# Patient Record
Sex: MALE | Race: WHITE | NOT HISPANIC OR LATINO | Employment: FULL TIME | URBAN - METROPOLITAN AREA
[De-identification: names, ages, dates, MRNs, and addresses within clinical notes are randomized per-mention and may not be internally consistent; named-entity substitution may affect disease eponyms.]

---

## 2024-06-20 ENCOUNTER — HOSPITAL ENCOUNTER (EMERGENCY)
Facility: HOSPITAL | Age: 59
Discharge: HOME | End: 2024-06-20
Payer: COMMERCIAL

## 2024-06-20 VITALS
HEART RATE: 67 BPM | BODY MASS INDEX: 28.17 KG/M2 | TEMPERATURE: 99.3 F | WEIGHT: 208 LBS | RESPIRATION RATE: 16 BRPM | DIASTOLIC BLOOD PRESSURE: 84 MMHG | HEIGHT: 72 IN | SYSTOLIC BLOOD PRESSURE: 142 MMHG | OXYGEN SATURATION: 98 %

## 2024-06-20 DIAGNOSIS — T15.92XA FOREIGN BODY, EYE, LEFT, INITIAL ENCOUNTER: Primary | ICD-10-CM

## 2024-06-20 PROCEDURE — 2500000001 HC RX 250 WO HCPCS SELF ADMINISTERED DRUGS (ALT 637 FOR MEDICARE OP): Performed by: NURSE PRACTITIONER

## 2024-06-20 PROCEDURE — 99283 EMERGENCY DEPT VISIT LOW MDM: CPT | Performed by: NURSE PRACTITIONER

## 2024-06-20 PROCEDURE — 65205 REMOVE FOREIGN BODY FROM EYE: CPT | Mod: LT | Performed by: NURSE PRACTITIONER

## 2024-06-20 PROCEDURE — 2500000005 HC RX 250 GENERAL PHARMACY W/O HCPCS: Performed by: NURSE PRACTITIONER

## 2024-06-20 RX ORDER — ERYTHROMYCIN 5 MG/G
1 OINTMENT OPHTHALMIC EVERY 6 HOURS
Qty: 1 G | Refills: 0 | Status: SHIPPED | OUTPATIENT
Start: 2024-06-20 | End: 2024-06-27

## 2024-06-20 RX ORDER — TETRACAINE HYDROCHLORIDE 5 MG/ML
1 SOLUTION OPHTHALMIC ONCE
Status: COMPLETED | OUTPATIENT
Start: 2024-06-20 | End: 2024-06-20

## 2024-06-20 RX ORDER — ERYTHROMYCIN 5 MG/G
1 OINTMENT OPHTHALMIC ONCE
Status: COMPLETED | OUTPATIENT
Start: 2024-06-20 | End: 2024-06-20

## 2024-06-20 ASSESSMENT — LIFESTYLE VARIABLES
EVER FELT BAD OR GUILTY ABOUT YOUR DRINKING: NO
HAVE PEOPLE ANNOYED YOU BY CRITICIZING YOUR DRINKING: NO
EVER HAD A DRINK FIRST THING IN THE MORNING TO STEADY YOUR NERVES TO GET RID OF A HANGOVER: NO
HAVE YOU EVER FELT YOU SHOULD CUT DOWN ON YOUR DRINKING: NO
TOTAL SCORE: 0

## 2024-06-20 ASSESSMENT — PAIN DESCRIPTION - ORIENTATION: ORIENTATION: LEFT

## 2024-06-20 ASSESSMENT — COLUMBIA-SUICIDE SEVERITY RATING SCALE - C-SSRS
2. HAVE YOU ACTUALLY HAD ANY THOUGHTS OF KILLING YOURSELF?: NO
1. IN THE PAST MONTH, HAVE YOU WISHED YOU WERE DEAD OR WISHED YOU COULD GO TO SLEEP AND NOT WAKE UP?: NO
6. HAVE YOU EVER DONE ANYTHING, STARTED TO DO ANYTHING, OR PREPARED TO DO ANYTHING TO END YOUR LIFE?: NO

## 2024-06-20 ASSESSMENT — VISUAL ACUITY
OS: 20/40
OD: 20/15
OU: 20/15
OU: 1

## 2024-06-20 ASSESSMENT — PAIN DESCRIPTION - LOCATION: LOCATION: EYE

## 2024-06-20 ASSESSMENT — PAIN SCALES - GENERAL: PAINLEVEL_OUTOF10: 3

## 2024-06-20 ASSESSMENT — PAIN - FUNCTIONAL ASSESSMENT: PAIN_FUNCTIONAL_ASSESSMENT: 0-10

## 2024-06-20 NOTE — ED PROVIDER NOTES
HPI   Chief Complaint   Patient presents with    Eye Pain     L eye states at work today at 10 am started to hurt.  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  He has tried self rinsing with no improvement.  He denies any fever, chills, vision loss, traumatic blow to the face, headache, numbness, weakness, earache, sore throat, chest pain, shortness of breath, nausea or vomiting.  He wears glasses, no contacts and has no history of macular degeneration, glaucoma or surgical history to the eye.  He does not recall the name of [facility].  His symptoms are mild to moderate in severity and persistent nature.      History provided by:  Patient and spouse   used: No                        Jennifer Coma Scale Score: 15                     Patient History   No past medical history on file.  Past Surgical History:   Procedure Laterality Date    US GUIDED ABDOMINAL PARACENTESIS  1/27/2020    US GUIDED ABDOMINAL PARACENTESIS 1/27/2020 [facility]ICAL LEGAC[facility]    US GUIDED ABDOMINAL PARACENTESIS  2/4/2020    US GUIDED ABDOMINAL PARACENTESIS 2/4/2020 [facility] CLINICAL LEGACY     No family history on file.  Social History     Tobacco Use    Smoking status: Not on file    Smokeless tobacco: Not on file   Substance Use Topics    Alcohol use: Not on file    Drug use: Not on file       Physical Exam   ED Triage Vitals [06/20/24 1658]   Temperature Heart Rate Respirations BP   37.4 °C (99.3 °F) 67 16 142/84      Pulse Ox Temp src Heart Rate Source Patient Position   98 % -- Monitor Standing      BP Location FiO2 (%)     Left arm --       Physical Exam  Vitals reviewed.   Constitutional:       General: He is not in acute distress.     Appearance: He is not ill-appearing.   HENT:      Head: Normocephalic and  atraumatic.      Right Ear: Hearing, tympanic membrane, ear canal and external ear normal.      Left Ear: Hearing, tympanic membrane, ear canal and external ear normal.      Nose: Rhinorrhea present.      Mouth/Throat:      Lips: Pink.      Mouth: Mucous membranes are moist.      Pharynx: Oropharynx is clear. Uvula midline.   Eyes:      General: Lids are normal. Vision grossly intact.         Right eye: No foreign body, discharge or hordeolum.         Left eye: Foreign body, discharge (clear tearing) and hordeolum (under the upper lid) present.     Extraocular Movements: Extraocular movements intact.      Conjunctiva/sclera:      Right eye: Right conjunctiva is not injected. No chemosis, exudate or hemorrhage.     Left eye: Left conjunctiva is injected. No chemosis, exudate or hemorrhage.     Pupils: Pupils are equal, round, and reactive to light.        Comments: Visual acuity reviewed. TIFFANI 4 mm.  No photophobia with penlight exam. No painful movements or entrapment with EOMs. No periorbital cellulitis or bony tenderness on palpation of the orbits. The eye was anesthetized with tetracaine and stained with fluorescein. Under Wood's Lamp exam there is a retained foreign body to the left eye as depicted above, no corneal abrasion prior to removal and no ulceration.   Cardiovascular:      Rate and Rhythm: Normal rate and regular rhythm.   Pulmonary:      Effort: Pulmonary effort is normal.      Breath sounds: Normal breath sounds.   Musculoskeletal:      Cervical back: Full passive range of motion without pain and neck supple.      Comments: BELKIS jimenes.   Lymphadenopathy:      Head:      Right side of head: No preauricular adenopathy.      Left side of head: No preauricular adenopathy.      Cervical: No cervical adenopathy.      Right cervical: No superficial cervical adenopathy.     Left cervical: No superficial cervical adenopathy.   Skin:     General: Skin is warm and dry.      Capillary Refill: Capillary refill  takes less than 2 seconds.      Findings: No rash.   Neurological:      General: No focal deficit present.      Mental Status: He is alert and oriented to person, place, and time.      Sensory: Sensation is intact.      Motor: Motor function is intact.      Coordination: Coordination is intact.      Gait: Gait is intact.      Comments: Clear speech.  No facial asymmetry.  Hand grasps, pushes, pulls, dorsiflexion and plantarflexion strong and equal bilaterally.   Psychiatric:         Mood and Affect: Mood and affect normal.         Behavior: Behavior is cooperative.         ED Course & MDM   ED [facility] as of 06/20/24 1927   [facility] Patient was flushed with 30 cc of normal saline by nursing staff.  Reevaluation under Woods lamp exam with tetracaine stain shows no obvious foreign body in mild corneal abrasion in the area of foreign body removal from cotton tip applicator.  Nursing staff to perform visual acuity and erythromycin ointment ordered.  We discussed outpatient follow-up with ophthalmology first in which patient will call tomorrow to arrange then call occupational medicine for follow-up.  Patient states he can return to work to his full duties. [NA]      ED Course User Index  [NA] Andreia Mathis, APRN-CNP         Diagnoses as of 06/20/24 1927   Foreign body, eye, left, initial encounter       Medical Decision Making  Patient presents the emergency department for evaluation of a foreign body to his left eye.  This occurred at work.  He does not have hyphema, evidence of globe rupture or traumatic blow to the head or face.  There is clinical evidence of a metal foreign body of about less than 1 mm in size and is consistent with the patient's clinical history.  He reports being allergic to Tdap therefore was not boosted.  He consented to removal with an 18-gauge needle which is believed to have been successful.  Plan is for erythromycin ointment and patient to call tomorrow to arrange appointment  with [facility] as he reports they do work comp.  Will provide the patient with [facility]'s information should they not do work comp at the [facility].  Once patient has his appointment, he will call occupational medicine to arrange follow-up as well.  Otherwise he does not have work restrictions and will return to work tomorrow as scheduled.  Otherwise erythromycin ointment, 1 cm ribbon to the affected eye 4 times daily for 7 days.    Patient is non-toxic, not hypoxic and appropriate for this outpatient management plan which they prefer. Encouragement to arrange close follow up was discussed as well as provided in a written handout of discharge instructions. Patient was educated on signs of symptoms to watch for indicative of re-evaluation in the emergency department setting to include any worsening of current symptoms. They verbalized understanding of instructions and is amenable to this treatment plan with no social determinants of health that would obscure this plan. Patient departed in stable condition.         Procedure  Time: 1900    FOREIGN BODY REMOVAL    Indication: foreign body left eye  Performed By: Andreia Mathis CNP  Risks, benefits and alternatives for the applicable procedure described. Opportunity for questions and answers provided to allow for informed decision making.  Consent: Verbal consent was obtained by the patient    Location:   left eye    Prep: The skin was none required  Local Anesthesia: tetracaine  Foreign Body was: Removed with a 18-gauge needle and metal in appearance  Complications: None  There were no complications and patient tolerated the procedure well.      CHIRAG Vasquez-SYBIL  06/20/24 1927